# Patient Record
Sex: MALE | Race: WHITE | NOT HISPANIC OR LATINO | Employment: OTHER | URBAN - METROPOLITAN AREA
[De-identification: names, ages, dates, MRNs, and addresses within clinical notes are randomized per-mention and may not be internally consistent; named-entity substitution may affect disease eponyms.]

---

## 2017-04-12 ENCOUNTER — TELEPHONE (OUTPATIENT)
Dept: OTOLARYNGOLOGY | Facility: CLINIC | Age: 72
End: 2017-04-12

## 2017-05-03 ENCOUNTER — TELEPHONE (OUTPATIENT)
Dept: OTOLARYNGOLOGY | Facility: CLINIC | Age: 72
End: 2017-05-03

## 2017-05-30 ENCOUNTER — TELEPHONE (OUTPATIENT)
Dept: OTOLARYNGOLOGY | Facility: CLINIC | Age: 72
End: 2017-05-30

## 2017-05-30 NOTE — TELEPHONE ENCOUNTER
----- Message from Maricel Fontenot sent at 5/30/2017  9:59 AM CDT -----  Contact: ada Ramirez pt-Pt  Was called today and was left a message that Dr. Ramirez will not be there June 6.  This is the second time pt has been cancelled and  Is  Very unhappy.  Pt wants the soonest appt  In the afternoon that you can give.  Pt does  Not want to wait another long time frame.  Pt can be reached at [phone] his home; it is a land line phone.  Leave a date and time on his voice.  Wants something this week if possible.  Thanks so much

## 2017-05-31 ENCOUNTER — OFFICE VISIT (OUTPATIENT)
Dept: OTOLARYNGOLOGY | Facility: CLINIC | Age: 72
End: 2017-05-31
Payer: MEDICARE

## 2017-05-31 VITALS
WEIGHT: 160.25 LBS | BODY MASS INDEX: 25.1 KG/M2 | DIASTOLIC BLOOD PRESSURE: 82 MMHG | HEART RATE: 133 BPM | SYSTOLIC BLOOD PRESSURE: 135 MMHG

## 2017-05-31 DIAGNOSIS — M95.0 NASAL VALVE COLLAPSE: Primary | ICD-10-CM

## 2017-05-31 DIAGNOSIS — J34.2 DEVIATED NASAL SEPTUM: ICD-10-CM

## 2017-05-31 DIAGNOSIS — J34.89 CONCHA BULLOSA: ICD-10-CM

## 2017-05-31 PROCEDURE — 99213 OFFICE O/P EST LOW 20 MIN: CPT | Mod: PBBFAC,25 | Performed by: OTOLARYNGOLOGY

## 2017-05-31 PROCEDURE — 31231 NASAL ENDOSCOPY DX: CPT | Mod: PBBFAC | Performed by: OTOLARYNGOLOGY

## 2017-05-31 PROCEDURE — 99999 PR PBB SHADOW E&M-EST. PATIENT-LVL III: CPT | Mod: PBBFAC,,, | Performed by: OTOLARYNGOLOGY

## 2017-05-31 PROCEDURE — 99204 OFFICE O/P NEW MOD 45 MIN: CPT | Mod: 25,S$PBB,, | Performed by: OTOLARYNGOLOGY
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
## 2017-05-31 NOTE — PROCEDURES
Nasal/sinus endoscopy  Date/Time: 5/31/2017 4:34 PM  Performed by: AVINASH TURNER  Authorized by: AVINASH TURNER     Consent Done?:  Yes (Verbal)  Anesthesia:     Local anesthetic:  Lidocaine 4% and Manjinder-Synephrine 1/2%    Patient tolerance:  Patient tolerated the procedure well with no immediate complications  Nose:     Procedure Performed:  Nasal Endoscopy  External:      External nasal deformity  Intranasal:      Mucosa no polyps     Mucosa ulcers not present     No mucosa lesions present     Enlarged turbinates     No septum gross deformity  Nasopharynx:      No mucosa lesions     Adenoids not present     Posterior choanae patent     Eustachian tube patent     Prominent left mariaa bullosa.  Marked rightward septal deviation and spurs.  No polyps or purulence.  Nasopharynx clear.

## 2017-06-01 ENCOUNTER — TELEPHONE (OUTPATIENT)
Dept: OTOLARYNGOLOGY | Facility: CLINIC | Age: 72
End: 2017-06-01

## 2017-06-06 ENCOUNTER — TELEPHONE (OUTPATIENT)
Dept: OTOLARYNGOLOGY | Facility: CLINIC | Age: 72
End: 2017-06-06

## 2017-06-06 NOTE — TELEPHONE ENCOUNTER
Left message along with contact information to please call back in regard to our previous conversation.

## 2017-06-08 ENCOUNTER — TELEPHONE (OUTPATIENT)
Dept: OTOLARYNGOLOGY | Facility: CLINIC | Age: 72
End: 2017-06-08

## 2017-06-08 NOTE — TELEPHONE ENCOUNTER
Spoke to patient. As per Dr. Huitron may use flonase. Do not advise afrin. Annual hearing test if notice change in hearing or tinnitus. Verbalized understanding however states flonase doesn't really help him. Writer will inform Dr. Huitron of such via in basket message.

## 2018-02-28 ENCOUNTER — DOCUMENTATION ONLY (OUTPATIENT)
Dept: PSYCHIATRY | Facility: HOSPITAL | Age: 73
End: 2018-02-28

## 2018-02-28 NOTE — PSYCH
Pt contacted SW to inquire about BMU. SW reviewed BMU structure and program expectations. Pt expressed ambivalence to program structure and reported that he will call back if pt decides to attend program.

## 2019-09-16 ENCOUNTER — TELEPHONE (OUTPATIENT)
Dept: OTOLARYNGOLOGY | Facility: CLINIC | Age: 74
End: 2019-09-16

## 2019-09-20 ENCOUNTER — TELEPHONE (OUTPATIENT)
Dept: OTOLARYNGOLOGY | Facility: CLINIC | Age: 74
End: 2019-09-20

## 2019-09-27 ENCOUNTER — TELEPHONE (OUTPATIENT)
Dept: OTOLARYNGOLOGY | Facility: CLINIC | Age: 74
End: 2019-09-27

## 2019-09-30 ENCOUNTER — TELEPHONE (OUTPATIENT)
Dept: OTOLARYNGOLOGY | Facility: CLINIC | Age: 74
End: 2019-09-30

## 2019-10-04 ENCOUNTER — TELEPHONE (OUTPATIENT)
Dept: OTOLARYNGOLOGY | Facility: CLINIC | Age: 74
End: 2019-10-04

## 2019-10-29 ENCOUNTER — TELEPHONE (OUTPATIENT)
Dept: OTOLARYNGOLOGY | Facility: CLINIC | Age: 74
End: 2019-10-29

## 2019-11-29 ENCOUNTER — TELEPHONE (OUTPATIENT)
Dept: OTOLARYNGOLOGY | Facility: CLINIC | Age: 74
End: 2019-11-29

## 2019-11-29 NOTE — TELEPHONE ENCOUNTER
Left message on voicemail for pt to call back when received message in regards to rescheduling appointment with Dr. Pahceco on 12/04/2019.

## 2019-12-04 ENCOUNTER — OFFICE VISIT (OUTPATIENT)
Dept: OTOLARYNGOLOGY | Facility: CLINIC | Age: 74
End: 2019-12-04
Payer: MEDICARE

## 2019-12-04 VITALS
HEART RATE: 129 BPM | SYSTOLIC BLOOD PRESSURE: 128 MMHG | WEIGHT: 160.25 LBS | DIASTOLIC BLOOD PRESSURE: 80 MMHG | BODY MASS INDEX: 25.1 KG/M2

## 2019-12-04 DIAGNOSIS — M95.0 NASAL DEFORMITY, ACQUIRED: Primary | ICD-10-CM

## 2019-12-04 DIAGNOSIS — J34.89 NASAL OBSTRUCTION: ICD-10-CM

## 2019-12-04 PROCEDURE — 99999 PR PBB SHADOW E&M-EST. PATIENT-LVL III: CPT | Mod: PBBFAC,,, | Performed by: OTOLARYNGOLOGY

## 2019-12-04 PROCEDURE — 1125F PR PAIN SEVERITY QUANTIFIED, PAIN PRESENT: ICD-10-PCS | Mod: ,,, | Performed by: OTOLARYNGOLOGY

## 2019-12-04 PROCEDURE — 99214 OFFICE O/P EST MOD 30 MIN: CPT | Mod: S$PBB,,, | Performed by: OTOLARYNGOLOGY

## 2019-12-04 PROCEDURE — 99214 PR OFFICE/OUTPT VISIT, EST, LEVL IV, 30-39 MIN: ICD-10-PCS | Mod: S$PBB,,, | Performed by: OTOLARYNGOLOGY

## 2019-12-04 PROCEDURE — 1159F MED LIST DOCD IN RCRD: CPT | Mod: ,,, | Performed by: OTOLARYNGOLOGY

## 2019-12-04 PROCEDURE — 99999 PR PBB SHADOW E&M-EST. PATIENT-LVL III: ICD-10-PCS | Mod: PBBFAC,,, | Performed by: OTOLARYNGOLOGY

## 2019-12-04 PROCEDURE — 99213 OFFICE O/P EST LOW 20 MIN: CPT | Mod: PBBFAC | Performed by: OTOLARYNGOLOGY

## 2019-12-04 PROCEDURE — 1125F AMNT PAIN NOTED PAIN PRSNT: CPT | Mod: ,,, | Performed by: OTOLARYNGOLOGY

## 2019-12-04 PROCEDURE — 1159F PR MEDICATION LIST DOCUMENTED IN MEDICAL RECORD: ICD-10-PCS | Mod: ,,, | Performed by: OTOLARYNGOLOGY
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
## 2020-02-18 ENCOUNTER — TELEPHONE (OUTPATIENT)
Dept: OTOLARYNGOLOGY | Facility: CLINIC | Age: 75
End: 2020-02-18

## 2020-09-29 ENCOUNTER — TELEPHONE (OUTPATIENT)
Dept: SPINE | Facility: CLINIC | Age: 75
End: 2020-09-29

## 2020-09-29 NOTE — TELEPHONE ENCOUNTER
Contacted patient in regards to a complaint of pt being told he needed a referral to see [facility].     Pt states that he was told by Neurosurgery at [facility] that he would need a referral to see Neurosurgery. Pt was then told to contact [facility] for an appt. Pt states that he has already seen [facility], Ortho, and Pain and is requesting to see Neurosurgery. Pt was scheduled in our 1st available to discuss. No further questions or concerns.

## 2020-10-01 ENCOUNTER — TELEPHONE (OUTPATIENT)
Dept: PAIN MEDICINE | Facility: CLINIC | Age: 75
End: 2020-10-01

## 2020-10-01 ENCOUNTER — TELEPHONE (OUTPATIENT)
Dept: SPINE | Facility: CLINIC | Age: 75
End: 2020-10-01

## 2020-10-01 NOTE — TELEPHONE ENCOUNTER
"Returned pts call. Pt was confused as to whom he spoke to. There is no one in our office named "July". Pt was asked what he needed and he kept repeating that someone called him from [facility] but had not further information. Pt cancelled his appt with [facility]. Pt was encouraged to est care with a PCP. No further questions or concerns.   "

## 2020-10-01 NOTE — TELEPHONE ENCOUNTER
----- Message from Ijeoma Alvarez sent at 10/1/2020 12:02 PM CDT -----  Regarding: Patiet call back  Who called:MADAI PICHARDO [[id number]]    What is the request in detail: Patient is requesting a call back. He states he would lie to speak with [facility]. He states he's seen thee ortho doctors that he does not want to go back to. He states he also attempted a neuro appt, however, they will not let him return. He states since he has been through so much, he  just wants an injection to stop his pain even if it's temporary. He states this is regarding his appt 10/27 that he decided to cancel. He states he saw two physicians Dr. Li and Dr. Rodriguez pain management at [facility] with no results. He states he is frustrated that he has had this pain for 2 years and has not had any results. He also states he is suffering wit depression and anxiety on top of his back pain. He states he thinks hi pain I mostly from the mattress he sleeps on.   Please advise.    Can the clinic reply by [facility]? No    Best call back number: [phone]    Additional Information: N/A

## 2021-04-28 ENCOUNTER — TELEPHONE (OUTPATIENT)
Dept: NEUROSURGERY | Facility: CLINIC | Age: 76
End: 2021-04-28

## 2021-05-06 ENCOUNTER — TELEPHONE (OUTPATIENT)
Dept: NEUROSURGERY | Facility: CLINIC | Age: 76
End: 2021-05-06

## 2022-02-02 ENCOUNTER — NURSE TRIAGE (OUTPATIENT)
Dept: ADMINISTRATIVE | Facility: CLINIC | Age: 77
End: 2022-02-02
Payer: COMMERCIAL

## 2022-02-02 NOTE — TELEPHONE ENCOUNTER
Pt calling to speak with Dr. Choudhary or Rinku, with neurosurgery.  He saw a commercial on TV that stated if you have back pain to call, when he called, they gave him both Dr. Choudhary and Dr. Dobbs' number, and he spoke with both physicians already, treatment plan was for surgery, but patient states he couldn't proceed at the time due to him living alone and not having any support network.  He has additional questions about whether Dr. Dobbs  thinks that Physical Therapy would improve his symptoms.  He is requesting a call back to discuss.  Best contact # [phone]  Reason for Disposition   Nursing judgment    Additional Information   Negative: Nursing judgment   Negative: Nursing judgment    Protocols used: ST NO PROTOCOL AVAILABLE - INFORMATION ONLY-A-[place]

## 2022-02-07 NOTE — TELEPHONE ENCOUNTER
Maranda please schedule with me.    Keesha Rodriguez, [facility], PA-C  Neurosurgery  [facility]  02/07/2022

## 2022-03-04 ENCOUNTER — TELEPHONE (OUTPATIENT)
Dept: OTOLARYNGOLOGY | Facility: CLINIC | Age: 77
End: 2022-03-04
Payer: COMMERCIAL

## 2022-03-05 NOTE — TELEPHONE ENCOUNTER
Called the patient and he asked if he had surgery on his nose that two other doctors not at [facility] said that his nose would collapse and they would have to use cartilage from another area in order for it to work.  I explained yes that it is true.  He wanted me to ask Dr. Pacheco if what his opinion was on his nose.  I explained he had not seen Dr. Pacheco since December 2019 and he saw Dr. Cruz in 2017.  I offered for him to do a virtual visit with Dr. Pacheco or come in for a visit but he said he didn't want to pay his $200 for a visit so he wanted to know what he could do to get around that.  I explained that it would be necessary for him to be evaluated before a doctor would give his advise since it had been so long since he was last seen.  He said our  do not know how to answer questions and I explained I was a nurse and answered his question.  He proceeded to tell me that I answered part of his question.  I offered again to make an appointment and the patient did not want to.  I proceeded to ask him to call back when he wanted to do that.  He agreed he would.

## 2022-03-23 ENCOUNTER — TELEPHONE (OUTPATIENT)
Dept: OTOLARYNGOLOGY | Facility: CLINIC | Age: 77
End: 2022-03-23
Payer: COMMERCIAL

## 2022-03-23 NOTE — TELEPHONE ENCOUNTER
Spoke with patient regarding Sinus issues he have been having related to his septum. Patient wanted medical advice over the telephone. I mentioned to him that it has been nearly 5 years since he last seen , his last visit was in 2017 he would need to come for an evaluation in order for the provider to better assist with his needs and see exactly what is going on with his sinuses. The patient mentioned he do not have $100 to pay for an office visit. I stated I am sorry but I was letting him know what the provider stated and in order for him to help he would need to be scheduled at this time there is nothing that can be done by our office. Patient stated there is something that can be done and doctors only want to help if they are making money. I stated with the time frame of his last visit it would be hard to give medical advice on his specific case when it is not known, he would need to be seen in office. I offered to get him scheduled. Patient hung up.

## 2022-03-23 NOTE — TELEPHONE ENCOUNTER
----- Message from Ezequiel Huitron MD sent at 3/23/2022  1:55 PM CDT -----  Regarding: RE: Call Back  Tell him that I haven't seen him for nearly 5 years, so it's difficult for me to give advice on his specific case.  He's also seen Dr. Pacheco more recently.  In general I doubt there is a new medication that will help that he hasn't tried before.    ----- Message -----  From: Yaw Corado MA  Sent: 3/23/2022   1:43 PM CDT  To: Ezequiel Huitron MD  Subject: FW: Call Back                                    Please advise. I will contact patient afterwards.  ----- Message -----  From: Kory Prince  Sent: 3/23/2022   1:09 PM CDT  To: Elana Nieves Staff  Subject: Call Back                                        Who Called: pt          What is the reason for the call: calling in regards to septum in nose. States he is waking up every morning and his right nostril is closed due to septum being to the left. Would like to know if there is some type of medication he can take to prevent this from happening. Please contact to further assist.         Can patient be contacted on TeleCommunication Systemshart: No          Call back number: [phone]

## 2022-09-06 ENCOUNTER — TELEPHONE (OUTPATIENT)
Dept: UROLOGY | Facility: CLINIC | Age: 77
End: 2022-09-06
Payer: MEDICARE

## 2022-09-06 NOTE — TELEPHONE ENCOUNTER
----- Message from Eleanor Ordonez RN sent at 9/1/2022  5:36 PM CDT -----  Contact: @[phone]    ----- Message -----  From: Martha Peres  Sent: 9/1/2022   3:35 PM CDT  To: Ziyad WISEMAN Staff    Pt is calling in to schedule an appt due to check up, please call to discuss further.

## 2022-09-06 NOTE — TELEPHONE ENCOUNTER
Call placed to patient. Name and date of birth. Patient stated he wanted to schedule an appointment and never received a call back. Patient informed provider was out of office. Patient offered assistance with scheduling and decline. Patient ended call.

## 2022-10-10 DIAGNOSIS — M46.1 SACROILIITIS, NOT ELSEWHERE CLASSIFIED: Primary | ICD-10-CM

## 2022-10-25 ENCOUNTER — TELEPHONE (OUTPATIENT)
Dept: ORTHOPEDICS | Facility: CLINIC | Age: 77
End: 2022-10-25
Payer: MEDICARE

## 2022-10-25 NOTE — TELEPHONE ENCOUNTER
Spoke to patient in regards to message. Stated to patient that I will have him speak to one of our residents. Patient spoke to resident Albaro in regards to a back and spine issue. Patient was satisfied with the answer from Albaro. Thanks.

## 2022-12-19 ENCOUNTER — TELEPHONE (OUTPATIENT)
Dept: PSYCHIATRY | Facility: CLINIC | Age: 77
End: 2022-12-19
Payer: MEDICARE

## 2024-03-05 ENCOUNTER — TELEPHONE (OUTPATIENT)
Dept: OTOLARYNGOLOGY | Facility: CLINIC | Age: 79
End: 2024-03-05
Payer: MEDICARE

## 2024-03-05 NOTE — TELEPHONE ENCOUNTER
----- Message from Ar Jansen sent at 3/5/2024  1:53 PM CST -----  Regarding: Pt advice  Contact: [phone]  Pt is calling to speak with the provider pt states he has tenitus states he can't take Asprin. Pt states he is taking Tramadol and Advil and if take to much it upsets the stomach . Please call would like to discuss

## 2024-03-05 NOTE — TELEPHONE ENCOUNTER
Patient had questions about medication he can take for ringing in his ears. Advised patient he needs to see general ENT since his last appointment was 3-5 yrs ago with Dr Pacheco and Dr Huitron, he voiced understanding.

## 2024-05-28 ENCOUNTER — TELEPHONE (OUTPATIENT)
Dept: OTOLARYNGOLOGY | Facility: CLINIC | Age: 79
End: 2024-05-28
Payer: MEDICARE

## 2024-05-28 NOTE — TELEPHONE ENCOUNTER
----- Message from Roseline Richey sent at 5/28/2024  4:06 PM CDT -----  Regarding: appt  Contact: [phone]  Patient is calling to schedule appointment due to left side of throat food get stuck and it  sometimes takes an hour before the pt vomits and is able to eat. Please contact patient to further discuss.

## 2025-08-13 ENCOUNTER — TELEPHONE (OUTPATIENT)
Dept: PAIN MEDICINE | Facility: CLINIC | Age: 80
End: 2025-08-13
Payer: MEDICARE